# Patient Record
Sex: FEMALE | Race: WHITE
[De-identification: names, ages, dates, MRNs, and addresses within clinical notes are randomized per-mention and may not be internally consistent; named-entity substitution may affect disease eponyms.]

---

## 2019-10-19 ENCOUNTER — HOSPITAL ENCOUNTER (OUTPATIENT)
Dept: HOSPITAL 19 - LDRO | Age: 34
Discharge: HOME | End: 2019-10-19
Payer: SELF-PAY

## 2019-10-19 VITALS — HEART RATE: 100 BPM | DIASTOLIC BLOOD PRESSURE: 76 MMHG | SYSTOLIC BLOOD PRESSURE: 124 MMHG

## 2019-10-19 VITALS — SYSTOLIC BLOOD PRESSURE: 137 MMHG | HEART RATE: 122 BPM | TEMPERATURE: 98.1 F | DIASTOLIC BLOOD PRESSURE: 82 MMHG

## 2019-10-19 VITALS — DIASTOLIC BLOOD PRESSURE: 64 MMHG | SYSTOLIC BLOOD PRESSURE: 115 MMHG | HEART RATE: 98 BPM

## 2019-10-19 VITALS — DIASTOLIC BLOOD PRESSURE: 76 MMHG | SYSTOLIC BLOOD PRESSURE: 115 MMHG | HEART RATE: 95 BPM

## 2019-10-19 VITALS — DIASTOLIC BLOOD PRESSURE: 79 MMHG | SYSTOLIC BLOOD PRESSURE: 118 MMHG | HEART RATE: 87 BPM

## 2019-10-19 VITALS — SYSTOLIC BLOOD PRESSURE: 120 MMHG | HEART RATE: 119 BPM | DIASTOLIC BLOOD PRESSURE: 80 MMHG | TEMPERATURE: 98.1 F

## 2019-10-19 DIAGNOSIS — Z3A.39: ICD-10-CM

## 2019-10-19 DIAGNOSIS — O99.89: Primary | ICD-10-CM

## 2019-10-19 DIAGNOSIS — M54.9: ICD-10-CM

## 2019-10-19 LAB
BASOPHILS # BLD: 0 10*3/UL (ref 0–0.2)
BASOPHILS NFR BLD AUTO: 0.4 % (ref 0–2)
DRUGS UR SCN NOM: NEGATIVE NG/ML
EOSINOPHIL # BLD: 0.1 10*3/UL (ref 0–0.7)
EOSINOPHIL NFR BLD: 1.1 % (ref 0–4)
ERYTHROCYTE [DISTWIDTH] IN BLOOD BY AUTOMATED COUNT: 12.9 % (ref 11.5–14.5)
GRANULOCYTES # BLD AUTO: 70 % (ref 42.2–75.2)
HCT VFR BLD AUTO: 38.6 % (ref 37–47)
HGB BLD-MCNC: 13.3 G/DL (ref 12.5–16)
LYMPHOCYTES # BLD: 1.5 10*3/UL (ref 1.2–3.4)
LYMPHOCYTES NFR BLD: 20.9 % (ref 20–51)
MCH RBC QN AUTO: 31 PG (ref 27–31)
MCHC RBC AUTO-ENTMCNC: 35 G/DL (ref 33–37)
MCV RBC AUTO: 91 FL (ref 80–100)
MONOCYTES # BLD: 0.5 10*3/UL (ref 0.1–0.6)
MONOCYTES NFR BLD AUTO: 7 % (ref 1.7–9.3)
NEUTROPHILS # BLD: 5 10*3/UL (ref 1.4–6.5)
PH UR STRIP.AUTO: 6 [PH] (ref 5–8)
PLATELET # BLD AUTO: 320 K/MM3 (ref 130–400)
PMV BLD AUTO: 10.2 FL (ref 7.4–10.4)
RBC # BLD AUTO: 4.23 M/MM3 (ref 4.1–5.3)
RBC # UR STRIP.AUTO: (no result) /UL
SP GR UR STRIP.AUTO: 1.02 (ref 1–1.03)
SQUAMOUS # URNS: (no result) /HPF
URN COLLECT METHOD CLASS: (no result)
UROBILINOGEN UR STRIP.AUTO-MCNC: 2 MG/DL
WBC # UR: (no result) /HPF

## 2019-10-19 NOTE — NUR
PATIENT TP LR 5 FOR CHECK OF LOWER BACK PAIN. CHANGED INTO GOWN. ON EFM,
VITALS OBTAINED, SVE BY DANUTA, ASSESMENT COMPLETE. PATIENT STATES SHE WENT TO
THE EMERGENCY ROOM 1.5 MONTHS AGO AND FOUND OUT SHE WAS 7.5 MONTHS PREGNANT.
PATIENT BELIEVES SHE IS 39-40 WEEKS PREGNANT. PATIENT STATES HER  
A FEW MONTHS AGO FROM COMPICATIONS OF A  INJURY. PATIENT STATES SHE
HAS A 12 YEAR OLD DAUGHTER. PATIENT STATES SHE LIVES NEAR [place] AND PLANS
TO DELIVER THERE BUT NO DOCTOR WOULD TAKE HER THIS FAR ALONG. PATIENT WAS
RUNNING ERRANDS IN [place] AND HAD LOWER BACK PAIN, SO HEADED TO L&D.
PATIENT DENIES CONTRACTIONS, LEAKING OF FLUID OR BLEEDING.

## 2019-10-23 LAB — RPR (VDRL): (no result)
